# Patient Record
Sex: FEMALE | Race: BLACK OR AFRICAN AMERICAN | NOT HISPANIC OR LATINO | URBAN - METROPOLITAN AREA
[De-identification: names, ages, dates, MRNs, and addresses within clinical notes are randomized per-mention and may not be internally consistent; named-entity substitution may affect disease eponyms.]

---

## 2020-08-13 ENCOUNTER — OUT OF OFFICE VISIT (OUTPATIENT)
Dept: URBAN - METROPOLITAN AREA MEDICAL CENTER 19 | Facility: MEDICAL CENTER | Age: 45
End: 2020-08-13
Payer: COMMERCIAL

## 2020-08-13 DIAGNOSIS — R74.8 ABNORMAL ALT: ICD-10-CM

## 2020-08-13 PROCEDURE — 99253 IP/OBS CNSLTJ NEW/EST LOW 45: CPT | Performed by: INTERNAL MEDICINE

## 2020-08-13 PROCEDURE — 99243 OFF/OP CNSLTJ NEW/EST LOW 30: CPT | Performed by: INTERNAL MEDICINE

## 2020-09-01 ENCOUNTER — OFFICE VISIT (OUTPATIENT)
Dept: URBAN - METROPOLITAN AREA CLINIC 113 | Facility: CLINIC | Age: 45
End: 2020-09-01

## 2020-09-01 NOTE — HPI-TODAY'S VISIT:
43 y/o female presenting for f/u after initial hospitalization. She was seen as an inpatient at [facility] on 8/13/2020 for elevated liver enzymes. She presented with DUB and was admitted to the hospital. Her HGB was 6.7 as an outpatient but 10.3 on arrival. Her liver enzymes were mildly elevated (, ). She denied any abdominal pain or GI symptoms. She denied alcohol use or drug use. 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   I did perform labs as well as an US with doppler to work up her elevated liver enzymes; these were unremarkable.  ----- RUQ US 8/2020--fatty liver Doppler US 8/2020--normal.  Elevated LAE labs 8/2020--normal.

## 2020-09-04 ENCOUNTER — WEB ENCOUNTER (OUTPATIENT)
Dept: URBAN - METROPOLITAN AREA CLINIC 113 | Facility: CLINIC | Age: 45
End: 2020-09-04

## 2020-09-04 ENCOUNTER — OFFICE VISIT (OUTPATIENT)
Dept: URBAN - METROPOLITAN AREA CLINIC 113 | Facility: CLINIC | Age: 45
End: 2020-09-04
Payer: COMMERCIAL

## 2020-09-04 VITALS
TEMPERATURE: 97.1 F | BODY MASS INDEX: 38.51 KG/M2 | HEIGHT: 61 IN | RESPIRATION RATE: 18 BRPM | HEART RATE: 79 BPM | DIASTOLIC BLOOD PRESSURE: 88 MMHG | SYSTOLIC BLOOD PRESSURE: 132 MMHG | WEIGHT: 204 LBS

## 2020-09-04 DIAGNOSIS — D50.8 OTHER IRON DEFICIENCY ANEMIA: ICD-10-CM

## 2020-09-04 DIAGNOSIS — R74.8 ELEVATED LIVER ENZYMES: ICD-10-CM

## 2020-09-04 PROBLEM — 87522002: Status: ACTIVE | Noted: 2020-09-04

## 2020-09-04 PROBLEM — 707724006: Status: ACTIVE | Noted: 2020-09-01

## 2020-09-04 PROCEDURE — 1036F TOBACCO NON-USER: CPT | Performed by: INTERNAL MEDICINE

## 2020-09-04 PROCEDURE — G8421 BMI NOT CALCULATED: HCPCS | Performed by: INTERNAL MEDICINE

## 2020-09-04 PROCEDURE — 99213 OFFICE O/P EST LOW 20 MIN: CPT | Performed by: INTERNAL MEDICINE

## 2020-09-04 PROCEDURE — G8427 DOCREV CUR MEDS BY ELIG CLIN: HCPCS | Performed by: INTERNAL MEDICINE

## 2020-09-04 NOTE — HPI-TODAY'S VISIT:
45 y/o female presenting for f/u after initial hospitalization. She was seen as an inpatient at [facility] on 8/13/2020 for elevated liver enzymes. She presented with DUB and was admitted to the hospital. Her HGB was 6.7 as an outpatient but 10.3 on arrival. Her liver enzymes were mildly elevated (, ). She denied any abdominal pain or GI symptoms. She denied alcohol use or drug use. 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   I did perform labs as well as an US with doppler to work up her elevated liver enzymes; these were unremarkable.    She does have a hx of elevated liver enzymes in 2016 during her workup for [facility]. ----- RUQ US 8/2020--fatty liver Doppler US 8/2020--normal.  Elevated LAE labs 8/2020--normal.

## 2020-12-15 ENCOUNTER — OFFICE VISIT (OUTPATIENT)
Dept: URBAN - METROPOLITAN AREA CLINIC 113 | Facility: CLINIC | Age: 45
End: 2020-12-15

## 2023-05-10 ENCOUNTER — OFFICE VISIT (OUTPATIENT)
Dept: URBAN - METROPOLITAN AREA CLINIC 113 | Facility: CLINIC | Age: 48
End: 2023-05-10
Payer: COMMERCIAL

## 2023-05-10 VITALS
HEART RATE: 109 BPM | DIASTOLIC BLOOD PRESSURE: 126 MMHG | BODY MASS INDEX: 40.29 KG/M2 | TEMPERATURE: 97.1 F | SYSTOLIC BLOOD PRESSURE: 155 MMHG | WEIGHT: 213.4 LBS | HEIGHT: 61 IN | RESPIRATION RATE: 18 BRPM

## 2023-05-10 DIAGNOSIS — D50.0 IRON DEFICIENCY ANEMIA DUE TO CHRONIC BLOOD LOSS: ICD-10-CM

## 2023-05-10 DIAGNOSIS — R74.8 ELEVATED LIVER ENZYMES: ICD-10-CM

## 2023-05-10 PROBLEM — 724556004: Status: ACTIVE | Noted: 2023-05-10

## 2023-05-10 PROCEDURE — 99214 OFFICE O/P EST MOD 30 MIN: CPT | Performed by: INTERNAL MEDICINE

## 2023-05-10 PROCEDURE — 99244 OFF/OP CNSLTJ NEW/EST MOD 40: CPT | Performed by: INTERNAL MEDICINE

## 2023-05-10 RX ORDER — SODIUM, POTASSIUM,MAG SULFATES 17.5-3.13G
177ML SOLUTION, RECONSTITUTED, ORAL ORAL
Qty: 1 | OUTPATIENT
Start: 2023-05-10 | End: 2023-05-12

## 2023-05-10 RX ORDER — AMLODIPINE BESYLATE 10 MG/1
1 TABLET TABLET ORAL ONCE A DAY
Status: ACTIVE | COMMUNITY

## 2023-05-10 NOTE — HPI-TODAY'S VISIT:
46 y/o female referred by Dr. Roberto Carlos Galvez for elevated liver enzymes. She was seen for labs recently by her PCP which showed elevated liver enzymes (we do not have these records). She has had chronic anemia and workup has included OBGYN evaluation. She was seen in the ER in 2020 for elevated liver enzymes and had workup done which was negative. 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 We recommended a possible biopsy but she did not see us since 2020. Her most recent liver labs were normal in January 2023. She does have persistent anemia. PLT count is normal.  She has had DUB and has had OB f/u and evaluation.

## 2023-05-11 ENCOUNTER — TELEPHONE ENCOUNTER (OUTPATIENT)
Dept: URBAN - METROPOLITAN AREA CLINIC 113 | Facility: CLINIC | Age: 48
End: 2023-05-11

## 2023-05-11 RX ORDER — POLYETHYLENE GLYCOL 3350, SODIUM CHLORIDE, SODIUM BICARBONATE, POTASSIUM CHLORIDE 420; 11.2; 5.72; 1.48 G/4L; G/4L; G/4L; G/4L
AS DIRECTED POWDER, FOR SOLUTION ORAL ONCE
Qty: 420 GM | Refills: 0 | OUTPATIENT
Start: 2023-05-11 | End: 2023-06-10

## 2023-05-12 ENCOUNTER — OFFICE VISIT (OUTPATIENT)
Dept: URBAN - METROPOLITAN AREA MEDICAL CENTER 2 | Facility: MEDICAL CENTER | Age: 48
End: 2023-05-12
Payer: COMMERCIAL

## 2023-05-12 DIAGNOSIS — K63.3 APHTHOUS ULCER OF COLON: ICD-10-CM

## 2023-05-12 DIAGNOSIS — D50.9 ANEMIA: ICD-10-CM

## 2023-05-12 DIAGNOSIS — K52.89 (LYMPHOCYTIC) MICROSCOPIC COLITIS: ICD-10-CM

## 2023-05-12 PROCEDURE — 43235 EGD DIAGNOSTIC BRUSH WASH: CPT | Performed by: INTERNAL MEDICINE

## 2023-05-12 PROCEDURE — 45380 COLONOSCOPY AND BIOPSY: CPT | Performed by: INTERNAL MEDICINE

## 2023-05-16 ENCOUNTER — TELEPHONE ENCOUNTER (OUTPATIENT)
Dept: URBAN - METROPOLITAN AREA CLINIC 113 | Facility: CLINIC | Age: 48
End: 2023-05-16

## 2023-05-23 ENCOUNTER — TELEPHONE ENCOUNTER (OUTPATIENT)
Dept: URBAN - METROPOLITAN AREA CLINIC 113 | Facility: CLINIC | Age: 48
End: 2023-05-23

## 2023-06-14 ENCOUNTER — OFFICE VISIT (OUTPATIENT)
Dept: URBAN - METROPOLITAN AREA CLINIC 112 | Facility: CLINIC | Age: 48
End: 2023-06-14

## 2023-06-16 ENCOUNTER — OFFICE VISIT (OUTPATIENT)
Dept: URBAN - METROPOLITAN AREA CLINIC 112 | Facility: CLINIC | Age: 48
End: 2023-06-16

## 2023-06-16 ENCOUNTER — TELEPHONE ENCOUNTER (OUTPATIENT)
Dept: URBAN - METROPOLITAN AREA CLINIC 113 | Facility: CLINIC | Age: 48
End: 2023-06-16

## 2023-06-27 ENCOUNTER — OFFICE VISIT (OUTPATIENT)
Dept: URBAN - METROPOLITAN AREA SURGERY CENTER 25 | Facility: SURGERY CENTER | Age: 48
End: 2023-06-27

## 2023-08-10 ENCOUNTER — OFFICE VISIT (OUTPATIENT)
Dept: URBAN - METROPOLITAN AREA CLINIC 113 | Facility: CLINIC | Age: 48
End: 2023-08-10

## 2023-08-16 ENCOUNTER — OFFICE VISIT (OUTPATIENT)
Dept: URBAN - METROPOLITAN AREA CLINIC 112 | Facility: CLINIC | Age: 48
End: 2023-08-16

## 2023-08-16 ENCOUNTER — TELEPHONE ENCOUNTER (OUTPATIENT)
Dept: URBAN - METROPOLITAN AREA CLINIC 113 | Facility: CLINIC | Age: 48
End: 2023-08-16

## 2023-08-16 RX ORDER — AMLODIPINE BESYLATE 10 MG/1
1 TABLET TABLET ORAL ONCE A DAY
Status: ACTIVE | COMMUNITY

## 2023-08-29 ENCOUNTER — TELEPHONE ENCOUNTER (OUTPATIENT)
Dept: URBAN - METROPOLITAN AREA CLINIC 113 | Facility: CLINIC | Age: 48
End: 2023-08-29

## 2023-09-01 ENCOUNTER — OFFICE VISIT (OUTPATIENT)
Dept: URBAN - METROPOLITAN AREA CLINIC 107 | Facility: CLINIC | Age: 48
End: 2023-09-01

## 2023-09-01 RX ORDER — AMLODIPINE BESYLATE 10 MG/1
1 TABLET TABLET ORAL ONCE A DAY
Status: ACTIVE | COMMUNITY

## 2023-09-01 NOTE — HPI-TODAY'S VISIT:
47-year-old female presents for follow-up.  She was last seen 5/10/2023.  She was planned for EGD, colonoscopy and PillCam for iron deficiency anemia work-up as well as an ultrasound for work-up of elevated liver enzymes. Hospital EGD 5/12/2023:Performed without difficulty.  Normal esophagus, stomach and duodenum.  No specimens collected. Hospital colonoscopy 5/12/2023:Performed without difficulty.  BBPS score of 9 using Suprep.  Diverticulosis in the sigmoid and descending colon.  A single solitary ulcer noted in the descending colon and biopsied.  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  Distal rectum and anal verge are normal. PillCam has been canceled and rescheduled several times.  And is now scheduled for 9/15. She was seen in the ED on 8//2023 with complaints of chest pain.  EKG was normal though cannot rule out anterior infarct.  Labs revealed hemoglobin 11.5, normal hematocrit, low MCV 71.4, unremarkable CMP.  Chest x-ray revealed nothing acute. 5/10/2023: 46 y/o female referred by Dr. Roberto Carlos Galvez for elevated liver enzymes. She was seen for labs recently by her PCP which showed elevated liver enzymes (we do not have these records). She has had chronic anemia and workup has included OBGYN evaluation. She was seen in the ER in 2020 for elevated liver enzymes and had workup done which was negative. 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 We recommended a possible biopsy but she did not see us since 2020. Her most recent liver labs were normal in January 2023. She does have persistent anemia. PLT count is normal.  She has had DUB and has had OB f/u and evaluation.

## 2023-09-15 ENCOUNTER — OFFICE VISIT (OUTPATIENT)
Dept: URBAN - METROPOLITAN AREA CLINIC 112 | Facility: CLINIC | Age: 48
End: 2023-09-15

## 2023-09-15 RX ORDER — AMLODIPINE BESYLATE 10 MG/1
1 TABLET TABLET ORAL ONCE A DAY
Status: ACTIVE | COMMUNITY

## 2023-09-18 ENCOUNTER — TELEPHONE ENCOUNTER (OUTPATIENT)
Dept: URBAN - METROPOLITAN AREA CLINIC 113 | Facility: CLINIC | Age: 48
End: 2023-09-18

## 2023-12-20 ENCOUNTER — TELEPHONE ENCOUNTER (OUTPATIENT)
Dept: URBAN - METROPOLITAN AREA CLINIC 113 | Facility: CLINIC | Age: 48
End: 2023-12-20

## 2024-01-03 ENCOUNTER — TELEPHONE ENCOUNTER (OUTPATIENT)
Dept: URBAN - METROPOLITAN AREA CLINIC 113 | Facility: CLINIC | Age: 49
End: 2024-01-03

## 2024-02-12 ENCOUNTER — OV EP (OUTPATIENT)
Dept: URBAN - METROPOLITAN AREA CLINIC 113 | Facility: CLINIC | Age: 49
End: 2024-02-12

## 2024-03-13 ENCOUNTER — OV EP (OUTPATIENT)
Dept: URBAN - METROPOLITAN AREA CLINIC 113 | Facility: CLINIC | Age: 49
End: 2024-03-13
Payer: COMMERCIAL

## 2024-03-13 VITALS
SYSTOLIC BLOOD PRESSURE: 124 MMHG | WEIGHT: 204 LBS | DIASTOLIC BLOOD PRESSURE: 81 MMHG | HEART RATE: 83 BPM | TEMPERATURE: 97.5 F | HEIGHT: 61 IN | RESPIRATION RATE: 18 BRPM | BODY MASS INDEX: 38.51 KG/M2

## 2024-03-13 DIAGNOSIS — R74.8 ELEVATED LIVER ENZYMES: ICD-10-CM

## 2024-03-13 DIAGNOSIS — D50.8 OTHER IRON DEFICIENCY ANEMIA: ICD-10-CM

## 2024-03-13 DIAGNOSIS — R10.84 GENERALIZED ABDOMINAL PAIN: ICD-10-CM

## 2024-03-13 DIAGNOSIS — D50.0 IRON DEFICIENCY ANEMIA DUE TO CHRONIC BLOOD LOSS: ICD-10-CM

## 2024-03-13 PROCEDURE — 99214 OFFICE O/P EST MOD 30 MIN: CPT | Performed by: INTERNAL MEDICINE

## 2024-03-13 RX ORDER — AMLODIPINE BESYLATE 10 MG/1
1 TABLET TABLET ORAL ONCE A DAY
Status: ACTIVE | COMMUNITY

## 2024-03-13 NOTE — HPI-ZZZTODAY'S VISIT
48-year-old female presenting for follow-up.  She was last seen in May 2023.  She is followed for elevated liver enzymes and iron deficiency anemia.  Her workup did include an upper endoscopy and colonoscopy in May 2023.  On upper endoscopy she was found to have multiple diverticula through the sigmoid and descending colon.  She also had a solitary ulcer in the descending colon.  Biopsies did not demonstrate any chronic colitis.  On upper endoscopy her exam was normal.  She was recently seen in the emergency room in January 2024 as well as February 2024.  She did have chest pain and was found to have anemia.  Her hemoglobin was 10.8. 48-year-old female presenting for follow-up.  She was last seen in May 2023.  She is followed for elevated liver enzymes and iron deficiency anemia.  Her workup did include an upper endoscopy and colonoscopy in May 2023.  On upper endoscopy she was found to have multiple diverticula through the sigmoid and descending colon.  She also had a solitary ulcer in the descending colon.  Biopsies did not demonstrate any chronic colitis.  On upper endoscopy her exam was normal.  She was recently seen in the emergency room in January 2024 as well as February 2024.  She did have chest pain and was found to have anemia.  Her hemoglobin was 10.8.  She does have an adrenal gland lesion that is active. She was told she needs surgery to remove her adrenal lesion. She has had nausea/emesis/sweating daily.

## 2024-05-16 ENCOUNTER — DASHBOARD ENCOUNTERS (OUTPATIENT)
Age: 49
End: 2024-05-16

## 2024-05-16 ENCOUNTER — CLAIMS CREATED FROM THE CLAIM WINDOW (OUTPATIENT)
Dept: URBAN - METROPOLITAN AREA CLINIC 113 | Facility: CLINIC | Age: 49
End: 2024-05-16

## 2024-05-16 ENCOUNTER — OFFICE VISIT (OUTPATIENT)
Dept: URBAN - METROPOLITAN AREA CLINIC 113 | Facility: CLINIC | Age: 49
End: 2024-05-16
Payer: COMMERCIAL

## 2024-05-16 VITALS
BODY MASS INDEX: 39.08 KG/M2 | DIASTOLIC BLOOD PRESSURE: 79 MMHG | HEART RATE: 71 BPM | WEIGHT: 207 LBS | SYSTOLIC BLOOD PRESSURE: 117 MMHG | RESPIRATION RATE: 18 BRPM | HEIGHT: 61 IN | TEMPERATURE: 97.7 F

## 2024-05-16 DIAGNOSIS — R74.8 ELEVATED LIPASE: ICD-10-CM

## 2024-05-16 DIAGNOSIS — D50.0 IRON DEFICIENCY ANEMIA DUE TO CHRONIC BLOOD LOSS: ICD-10-CM

## 2024-05-16 DIAGNOSIS — K85.90 ACUTE PANCREATITIS: ICD-10-CM

## 2024-05-16 DIAGNOSIS — R10.84 GENERALIZED ABDOMINAL PAIN: ICD-10-CM

## 2024-05-16 PROCEDURE — 99213 OFFICE O/P EST LOW 20 MIN: CPT

## 2024-05-16 RX ORDER — CEPHALEXIN 500 MG/1
1 CAPSULE CAPSULE ORAL
Status: ACTIVE | COMMUNITY

## 2024-05-16 RX ORDER — DOXAZOSIN MESYLATE 4 MG/1
1 TABLET TABLET ORAL ONCE A DAY
Status: ACTIVE | COMMUNITY

## 2024-05-16 RX ORDER — FAMOTIDINE 20 MG/1
1 TABLET AT BEDTIME AS NEEDED TABLET, FILM COATED ORAL ONCE A DAY
Status: ACTIVE | COMMUNITY

## 2024-05-16 RX ORDER — RIMEGEPANT SULFATE 75 MG/75MG
1 TABLET ON THE TONGUE AND ALLOW TO DISSOLVE TABLET, ORALLY DISINTEGRATING ORAL
Status: ACTIVE | COMMUNITY

## 2024-05-16 RX ORDER — AMLODIPINE BESYLATE 10 MG/1
1 TABLET TABLET ORAL ONCE A DAY
Status: ACTIVE | COMMUNITY

## 2024-05-16 RX ORDER — LAMOTRIGINE 150 MG/1
1 TABLET TABLET ORAL ONCE A DAY
Status: ACTIVE | COMMUNITY

## 2024-05-17 LAB
A/G RATIO: 1.3
ABSOLUTE BASOPHILS: 48
ABSOLUTE EOSINOPHILS: 150
ABSOLUTE LYMPHOCYTES: 2319
ABSOLUTE MONOCYTES: 653
ABSOLUTE NEUTROPHILS: 3631
ALBUMIN: 4.1
ALKALINE PHOSPHATASE: 107
ALT (SGPT): 44
AST (SGOT): 25
BASOPHILS: 0.7
BILIRUBIN, TOTAL: 0.3
BUN/CREATININE RATIO: (no result)
BUN: 8
CALCIUM: 8.8
CARBON DIOXIDE, TOTAL: 27
CHLORIDE: 101
CREATININE: 0.66
EGFR: 108
EOSINOPHILS: 2.2
GLOBULIN, TOTAL: 3.1
GLUCOSE: 118
HEMATOCRIT: 35.5
HEMOGLOBIN: 11.2
LIPASE: 38
LYMPHOCYTES: 34.1
MCH: 24.6
MCHC: 31.5
MCV: 78
MONOCYTES: 9.6
MPV: 9.7
NEUTROPHILS: 53.4
PLATELET COUNT: 343
POTASSIUM: 3.3
PROTEIN, TOTAL: 7.2
RDW: 18.9
RED BLOOD CELL COUNT: 4.55
SODIUM: 138
WHITE BLOOD CELL COUNT: 6.8

## 2024-06-20 ENCOUNTER — OFFICE VISIT (OUTPATIENT)
Dept: URBAN - METROPOLITAN AREA CLINIC 113 | Facility: CLINIC | Age: 49
End: 2024-06-20

## 2024-10-23 ENCOUNTER — TELEPHONE ENCOUNTER (OUTPATIENT)
Dept: URBAN - METROPOLITAN AREA CLINIC 113 | Facility: CLINIC | Age: 49
End: 2024-10-23

## 2024-11-22 ENCOUNTER — OFFICE VISIT (OUTPATIENT)
Dept: URBAN - METROPOLITAN AREA CLINIC 107 | Facility: CLINIC | Age: 49
End: 2024-11-22

## 2024-11-22 RX ORDER — RIMEGEPANT SULFATE 75 MG/75MG
1 TABLET ON THE TONGUE AND ALLOW TO DISSOLVE TABLET, ORALLY DISINTEGRATING ORAL
Status: ACTIVE | COMMUNITY

## 2024-11-22 RX ORDER — LAMOTRIGINE 150 MG/1
1 TABLET TABLET ORAL ONCE A DAY
Status: ACTIVE | COMMUNITY

## 2024-11-22 RX ORDER — FAMOTIDINE 20 MG/1
1 TABLET AT BEDTIME AS NEEDED TABLET, FILM COATED ORAL ONCE A DAY
Status: ACTIVE | COMMUNITY

## 2024-11-22 RX ORDER — AMLODIPINE BESYLATE 10 MG/1
1 TABLET TABLET ORAL ONCE A DAY
Status: ACTIVE | COMMUNITY

## 2024-11-22 RX ORDER — CEPHALEXIN 500 MG/1
1 CAPSULE CAPSULE ORAL
Status: ACTIVE | COMMUNITY

## 2024-11-22 RX ORDER — DOXAZOSIN MESYLATE 4 MG/1
1 TABLET TABLET ORAL ONCE A DAY
Status: ACTIVE | COMMUNITY

## 2024-11-22 NOTE — HPI-TODAY'S VISIT:
48 for follow-up.  She was last seen in May 2024.    Labs 5/16/2024: Normal lipase, glucose 118, potassium 3.3, ALT 44 otherwise normal LFTs, hemoglobin 11.2, hematocrit 35.5, MCV 78.   Labs 11/7/2024: Normal H/H, MCV 74 LFTs, negative influenza and COVID testing.  5/2024  48 year old female with a history of moyamoya disease presents for abdominal pain. She started having abdominal pain and swelling of her right upper arm the night before Mother's day. She went to [facility] ED. She had an upper arm US which was negative for DVT. Her labs showed lipase of 500s and was diagnosed with pancreatitis. She was discharged same day.  She denies abdominal pain currently. She does have intermittent epigastric pain but nothing constant. She used to take BC powders frequently and stopped as she is now on Nurtec for migraines. Her bowel movements are fairly regular but whenever she sits on the toilet to have a BM, she will experience, nausea, sweating and dizziness. She does not strain. She states her endocrinologist is now wishing to hold off on surgery for her adrenal adenomas for at least a year. She would like a 2nd opinion. She states "god shows her prophecies."   3/30/2024 48-year-old female presenting for follow-up. She was last seen in May 2023. She is followed for elevated liver enzymes and iron deficiency anemia. Her workup did include an upper endoscopy and colonoscopy in May 2023. On upper endoscopy she was found to have multiple diverticula through the sigmoid and descending colon. She also had a solitary ulcer in the descending colon. Biopsies did not demonstrate any chronic colitis. On upper endoscopy her exam was normal. She was recently seen in the emergency room in January 2024 as well as February 2024. She did have chest pain and was found to have anemia. Her hemoglobin was 10.8.  She does have an adrenal gland lesion that is active. She was told she needs surgery to remove her adrenal lesion. She has had nausea/emesis/sweating daily.

## 2024-12-04 ENCOUNTER — OFFICE VISIT (OUTPATIENT)
Dept: URBAN - METROPOLITAN AREA CLINIC 113 | Facility: CLINIC | Age: 49
End: 2024-12-04